# Patient Record
Sex: FEMALE | Race: OTHER | Employment: UNEMPLOYED | URBAN - METROPOLITAN AREA
[De-identification: names, ages, dates, MRNs, and addresses within clinical notes are randomized per-mention and may not be internally consistent; named-entity substitution may affect disease eponyms.]

---

## 2021-04-26 ENCOUNTER — APPOINTMENT (OUTPATIENT)
Dept: GENERAL RADIOLOGY | Age: 51
End: 2021-04-26

## 2021-04-26 ENCOUNTER — HOSPITAL ENCOUNTER (EMERGENCY)
Age: 51
Discharge: LEFT BEFORE DISCHARGE | End: 2021-04-26
Attending: EMERGENCY MEDICINE

## 2021-04-26 VITALS
RESPIRATION RATE: 16 BRPM | HEART RATE: 84 BPM | OXYGEN SATURATION: 94 % | TEMPERATURE: 98.5 F | DIASTOLIC BLOOD PRESSURE: 111 MMHG | WEIGHT: 242.8 LBS | SYSTOLIC BLOOD PRESSURE: 187 MMHG

## 2021-04-26 DIAGNOSIS — Z20.822 COVID-19 VIRUS TEST RESULT UNKNOWN: ICD-10-CM

## 2021-04-26 DIAGNOSIS — J18.9 PNEUMONIA DUE TO ORGANISM: Primary | ICD-10-CM

## 2021-04-26 DIAGNOSIS — R05.9 COUGH: ICD-10-CM

## 2021-04-26 LAB
EKG ATRIAL RATE: 87 BPM
EKG DIAGNOSIS: NORMAL
EKG P AXIS: 54 DEGREES
EKG P-R INTERVAL: 134 MS
EKG Q-T INTERVAL: 388 MS
EKG QRS DURATION: 82 MS
EKG QTC CALCULATION (BAZETT): 466 MS
EKG R AXIS: -10 DEGREES
EKG T AXIS: -14 DEGREES
EKG VENTRICULAR RATE: 87 BPM
SARS-COV-2: NOT DETECTED

## 2021-04-26 PROCEDURE — 93010 ELECTROCARDIOGRAM REPORT: CPT | Performed by: INTERNAL MEDICINE

## 2021-04-26 PROCEDURE — U0005 INFEC AGEN DETEC AMPLI PROBE: HCPCS

## 2021-04-26 PROCEDURE — 93005 ELECTROCARDIOGRAM TRACING: CPT | Performed by: EMERGENCY MEDICINE

## 2021-04-26 PROCEDURE — 99283 EMERGENCY DEPT VISIT LOW MDM: CPT

## 2021-04-26 PROCEDURE — U0003 INFECTIOUS AGENT DETECTION BY NUCLEIC ACID (DNA OR RNA); SEVERE ACUTE RESPIRATORY SYNDROME CORONAVIRUS 2 (SARS-COV-2) (CORONAVIRUS DISEASE [COVID-19]), AMPLIFIED PROBE TECHNIQUE, MAKING USE OF HIGH THROUGHPUT TECHNOLOGIES AS DESCRIBED BY CMS-2020-01-R: HCPCS

## 2021-04-26 PROCEDURE — 71045 X-RAY EXAM CHEST 1 VIEW: CPT

## 2021-04-26 RX ORDER — AZITHROMYCIN 250 MG/1
250 TABLET, FILM COATED ORAL SEE ADMIN INSTRUCTIONS
Qty: 6 TABLET | Refills: 0 | Status: SHIPPED | OUTPATIENT
Start: 2021-04-26 | End: 2021-05-01

## 2021-04-26 SDOH — HEALTH STABILITY: MENTAL HEALTH: HOW OFTEN DO YOU HAVE A DRINK CONTAINING ALCOHOL?: NEVER

## 2021-04-26 ASSESSMENT — ENCOUNTER SYMPTOMS
RHINORRHEA: 0
BACK PAIN: 0
COUGH: 1
DIARRHEA: 0
PHOTOPHOBIA: 0
VOMITING: 0
ABDOMINAL DISTENTION: 0
SHORTNESS OF BREATH: 0
WHEEZING: 0
NAUSEA: 0

## 2021-04-26 NOTE — ED NOTES
Pt states that she has had a cough since last Friday night and denies any fevers or nasal congestion and is here to get a chest xray to look at her lungs. Pt denies any pain at this time.       Muna Curran RN  04/26/21 2457

## 2021-04-26 NOTE — ED NOTES
RN discussed plan of care with pt and she still does not want blood work done and pt states that she will come back if she feels worse. Pt signed Leotha Maverick form and witnessed by RN.       Salena Horton RN  04/26/21 [id number]

## 2021-04-26 NOTE — ED PROVIDER NOTES
Negative for chest pain and palpitations. Gastrointestinal: Negative for abdominal distention, diarrhea, nausea and vomiting. Genitourinary: Negative for dysuria and hematuria. Musculoskeletal: Negative for back pain and neck pain. Skin: Negative for rash and wound. Neurological: Negative for syncope and weakness. Psychiatric/Behavioral: Negative for agitation and confusion. Exam  ED Triage Vitals [04/26/21 0706]   BP Temp Temp Source Pulse Resp SpO2 Height Weight   (!) 197/101 98.5 °F (36.9 °C) Oral 88 20 98 % -- 242 lb 12.8 oz (110.1 kg)       Physical Exam  Vitals signs and nursing note reviewed. Constitutional:       General: She is not in acute distress. Appearance: She is well-developed. HENT:      Head: Normocephalic and atraumatic. Nose: Nose normal. No congestion. Eyes:      Extraocular Movements: Extraocular movements intact. Pupils: Pupils are equal, round, and reactive to light. Neck:      Musculoskeletal: Normal range of motion and neck supple. Cardiovascular:      Rate and Rhythm: Normal rate and regular rhythm. Heart sounds: No murmur. Comments: Equal radial pulses  Pulmonary:      Effort: Pulmonary effort is normal.      Breath sounds: Normal breath sounds. No wheezing, rhonchi or rales. Abdominal:      General: There is no distension. Palpations: Abdomen is soft. Tenderness: There is no abdominal tenderness. Musculoskeletal: Normal range of motion. General: No deformity. Right lower leg: Edema present. Left lower leg: Edema present. Skin:     General: Skin is warm. Findings: No rash. Neurological:      Mental Status: She is alert and oriented to person, place, and time. Motor: No abnormal muscle tone.       Coordination: Coordination normal.   Psychiatric:         Mood and Affect: Mood normal.         Behavior: Behavior normal.           ED Course    ED Medication Orders (From admission, onward) None          EKG  Normal sinus rhythm rate of 90 normal axis normal intervals no evidence of conduction abnormalities, no diagnostic ischemic changes noted, nonspecific ST flattening noted in the lateral leads no prior for comparison, . Radiology  Xr Chest 1 View    Result Date: 4/26/2021  XR CHEST 1 VIEW Indication: cough COMPARISON: None Findings: PA view of the chest was obtained. The heart is enlarged. Faint airspace opacity is noted in the left costophrenic angle. The lungs are otherwise clear. There is no pneumothorax or effusion. Impression: Cardiomegaly. Faint airspace opacity of the left lung base. Labs  Results for orders placed or performed during the hospital encounter of 04/26/21   EKG 12 Lead   Result Value Ref Range    Ventricular Rate 87 BPM    Atrial Rate 87 BPM    P-R Interval 134 ms    QRS Duration 82 ms    Q-T Interval 388 ms    QTc Calculation (Bazett) 466 ms    P Axis 54 degrees    R Axis -10 degrees    T Axis -14 degrees    Diagnosis       Normal sinus rhythmLow voltage QRSNonspecific T wave abnormalityProlonged QTAbnormal ECGConfirmed by SHANE BLANKENSHIP, Gabo Martini (Richard) on 4/26/2021 8:08:34 AM         [facility]  Patient seen and evaluated. Relevant records reviewed. 45-year-old female who presents with cough. On exam she is overall well-appearing no acute distress speaking full sentences. Her vitals are notable for hypertension systolic pressures approximately 190. Her exam is overall reassuring clear breath sounds no murmur, notable for mild pedal edema bilaterally. Her chest x-ray shows evidence of a infiltrate in the left costophrenic angle may be consistent with atypical pneumonia. Viral pneumonia possible Covid pneumonia also on differential.  She has nonspecific ST changes noted in her EKG and pedal edema I did recommend lab work including cardiac markers however patient is declining blood work.       I was notified by staff the the patient wishes to leave AGAINST MEDICAL ADVICE. I re-evaluated the patient and confirmed that he/she wishes to leave for reasons including declining further work-up, labs. I explained that I am concerned about patient's symptoms including cough shortness of breath, abnormal imaging and physical exam findings. I explained that I am concerned for pathologies including but not limited to ACS, CHF, other cardiopulmonary process. I told the they could get much worse, could become critically ill, and could possibly become disabled or die. I additionally explained the current results including labs and imaging, as well as pending results. They have verbalized understanding of my concerns. Despite my explanation, the patient still wishes to leave [address] The patient is clinically sober, free from distracting injury, appears to have intact insight and judgment and reason and in my opinion has the capacity to make decisions. I am unable to convince the patient to stay, I have asked them to return as soon as possible to complete their evaluation, as well as recommended follow up with primary provider. I have answered all their questions. She does not wish to have any additional testing other than x-ray and Covid swab which she is agreeable to. I explained to the patient that I am unable to rule out any further cardiopulmonary processes without lab work. Clinical Impression:  1. Pneumonia due to organism    2. Cough    3. COVID-19 virus test result unknown          Disposition:  Other Disposition: Left AMA in fair condition. Blood pressure (!) 187/111, pulse 84, temperature 98.5 °F (36.9 °C), temperature source Oral, resp. rate 16, weight 242 lb 12.8 oz (110.1 kg), last menstrual period 04/23/2021, SpO2 94 %. Patient was given scripts for the following medications. I counseled patient how to take these medications.    Discharge Medication List as of 4/26/2021  8:21 AM      START taking these medications    Details

## 2021-04-26 NOTE — ED NOTES
EKG done and pt is asking to not have blood work drawn at this time.       Janet Hendricks, DANIEL  04/26/21 [id number]

## 2024-03-28 ENCOUNTER — HOSPITAL ENCOUNTER (EMERGENCY)
Age: 54
Discharge: HOME | End: 2024-03-29
Attending: STUDENT IN AN ORGANIZED HEALTH CARE EDUCATION/TRAINING PROGRAM

## 2024-03-28 DIAGNOSIS — S01.01XA LACERATION OF SCALP, INITIAL ENCOUNTER: Primary | ICD-10-CM

## 2024-03-28 DIAGNOSIS — S09.90XA INJURY OF HEAD, INITIAL ENCOUNTER: ICD-10-CM

## 2024-03-28 PROCEDURE — 12002 RPR S/N/AX/GEN/TRNK2.6-7.5CM: CPT

## 2024-03-28 PROCEDURE — 99284 EMERGENCY DEPT VISIT MOD MDM: CPT

## 2024-03-28 ASSESSMENT — LIFESTYLE VARIABLES
HOW MANY STANDARD DRINKS CONTAINING ALCOHOL DO YOU HAVE ON A TYPICAL DAY: PATIENT DOES NOT DRINK
HOW OFTEN DO YOU HAVE A DRINK CONTAINING ALCOHOL: NEVER

## 2024-03-28 ASSESSMENT — PAIN - FUNCTIONAL ASSESSMENT: PAIN_FUNCTIONAL_ASSESSMENT: NONE - DENIES PAIN

## 2024-03-29 ENCOUNTER — APPOINTMENT (OUTPATIENT)
Dept: CT IMAGING | Age: 54
End: 2024-03-29

## 2024-03-29 VITALS
OXYGEN SATURATION: 96 % | HEIGHT: 64 IN | BODY MASS INDEX: 30.34 KG/M2 | WEIGHT: 177.69 LBS | DIASTOLIC BLOOD PRESSURE: 102 MMHG | TEMPERATURE: 98.4 F | RESPIRATION RATE: 18 BRPM | SYSTOLIC BLOOD PRESSURE: 149 MMHG | HEART RATE: 83 BPM

## 2024-03-29 PROCEDURE — 90471 IMMUNIZATION ADMIN: CPT | Performed by: STUDENT IN AN ORGANIZED HEALTH CARE EDUCATION/TRAINING PROGRAM

## 2024-03-29 PROCEDURE — 70450 CT HEAD/BRAIN W/O DYE: CPT

## 2024-03-29 PROCEDURE — 6370000000 HC RX 637 (ALT 250 FOR IP): Performed by: STUDENT IN AN ORGANIZED HEALTH CARE EDUCATION/TRAINING PROGRAM

## 2024-03-29 PROCEDURE — 90715 TDAP VACCINE 7 YRS/> IM: CPT | Performed by: STUDENT IN AN ORGANIZED HEALTH CARE EDUCATION/TRAINING PROGRAM

## 2024-03-29 PROCEDURE — 6360000002 HC RX W HCPCS: Performed by: STUDENT IN AN ORGANIZED HEALTH CARE EDUCATION/TRAINING PROGRAM

## 2024-03-29 RX ADMIN — Medication 3 ML: at 00:40

## 2024-03-29 RX ADMIN — TETANUS TOXOID, REDUCED DIPHTHERIA TOXOID AND ACELLULAR PERTUSSIS VACCINE, ADSORBED 0.5 ML: 5; 2.5; 8; 8; 2.5 SUSPENSION INTRAMUSCULAR at 00:39

## 2024-03-29 NOTE — ED PROVIDER NOTES
[facility]      EMERGENCY MEDICINE     Pt Name: Valarie Guerra  MRN: [id number]  Birthdate 1970  Date of evaluation: 3/28/2024  Provider: Diomedes Sol MD    CHIEF COMPLAINT       Chief Complaint   Patient presents with    Fall     Pt. Reports trip and fall while at [employer], pt. Reports head bleeding, bleeding controlled at this time.      HISTORY OF PRESENT ILLNESS   Valarie Guerra is a 53 y.o. female who presents to the emergency department for fall sideways at [employer] during Ramadan her head on a children's metal toy.  No LOC.  On no anticoagulant medications per patient. Cannot recall her last tetanus shot.     PASTMEDICAL HISTORY   History reviewed. No pertinent past medical history.    There is no problem list on file for this patient.    SURGICAL HISTORY     History reviewed. No pertinent surgical history.    CURRENT MEDICATIONS       Previous Medications    No medications on file       ALLERGIES     has No Known Allergies.    FAMILY HISTORY     has no family status information on file.        SOCIAL HISTORY       Social History     Tobacco Use    Smoking status: Never    Smokeless tobacco: Never   Substance Use Topics    Alcohol use: Never    Drug use: Never       PHYSICAL EXAM       ED Triage Vitals [03/28/24 2353]   BP Temp Temp Source Pulse Respirations SpO2 Height Weight - Scale   (!) 189/109 98.4 °F (36.9 °C) Oral 83 18 98 % 1.626 m (5' 4\") 80.6 kg (177 lb 11.1 oz)       Physical Exam  Vitals and nursing note reviewed.   Constitutional:       General: She is in acute distress.      Appearance: She is not ill-appearing, toxic-appearing or diaphoretic.   HENT:      Head:      Comments: 2.6 cm laceration to occiput no active bleeding     Right Ear: External ear normal.      Left Ear: External ear normal.      Nose: Nose normal.   Eyes:      Extraocular Movements: Extraocular movements intact.      Conjunctiva/sclera: Conjunctivae normal.      Pupils: Pupils

## 2024-03-29 NOTE — DISCHARGE INSTRUCTIONS
You may take Tylenol Motrin for pain control.  Return here, urgent care or your family doctor to have your staples removed in next 7 to 10 days.  You may shower..  Return if you develop any new or worsening symptoms.